# Patient Record
Sex: FEMALE | Race: WHITE | Employment: UNEMPLOYED | URBAN - METROPOLITAN AREA
[De-identification: names, ages, dates, MRNs, and addresses within clinical notes are randomized per-mention and may not be internally consistent; named-entity substitution may affect disease eponyms.]

---

## 2022-11-03 NOTE — PROGRESS NOTES
ASSESSMENT/PLAN:  Below is the assessment and plan developed based on review of pertinent history, physical exam, labs, studies, and medications. 1. Right knee pain, unspecified chronicity  -     XR KNEE RT MIN 4 V; Future  2. Chondromalacia of patellofemoral joint, right  -     MRI KNEE RT WO CONT; Future  -     REFERRAL TO [facility]  -     [place] BELT STRAP SLEEV GRMNT COVER      Return for Follow-up after diagnostic test.    In discussion with the patient, we considered the numerus possible diagnoses that could be contributing to their present symptoms. We also deliberated on the extensive management options that must be considered to treat their current condition. We reviewed their accessible prior medical records, diagnostic tests, and current health and employment information. We considered how these symptoms were affecting the patient´s activities of daily living as well as employment and fitness activities. The patient had various questions regarding the possible risks, benefits, complications, morbidity and mortality regarding their diagnosis and treatment options. The patients´ comorbidities were considered, and I advocated that they consider maximizing lifestyle modification through nutrition and exercise to aid in addressing their symptoms. Shared decision making yielded an understanding to move forward with conservation treatment preferences. The patient expressed understanding that if conservative management fails to alleviate the present symptoms they will return to office for re-evaluation and consideration of additional diagnostic tests and potential surgical options. In the interim, we have recommended ice, elevation, and anti-inflammatory medications along with a physician directed home exercise program. We discussed the risks and common side effects of anti-inflammatory medications and instructed the patient to discontinue the medication and contact us if they experienced any side effects.  The patient was encouraged to discuss the possible side effects with their family physician or pharmacist prior to initiating any new medications. Given that the patient's symptoms are increasing in frequency and duration, we have decided to evaluate the etiology of the pain and loss of function with an MRI. We discussed the risks of an MRI which include, but are not limited to the enclosed space, noisy environment, magnetic effect on implanted metal. We also talked about the fact that MRI is also contraindicated in the presence of internal metallic objects such as bullets or shrapnel, as well as surgical clips, pins, plates, screws, metal sutures, or wire mesh. We talked about the fact that MRI does not use radiation, but it may be contrindicated if the patient has implanted pacemakers, intracranial aneurysm clips, cochlear implants, certain prosthetic devices, implanted drug infusion pumps, neurostimulators, bone-growth stimulators, certain intrauterine contraceptive devices; or any other type of iron-based metal implants. We discussed the fact that if you are pregnant or suspect that you may be pregnant, you should notify your physician and consult with your primary care or obstetrician before having an MRI. Although rare, we talked about the fact that if contrast dye is used, there is a risk for allergic reaction to the dye. Patients who are allergic to or sensitive to medications, contrast dye, iodine, or shellfish should notify the radiologist or technologist prior to the administration of dye. MRI contrast may also influence other conditions such as allergies, asthma, anemia, hypotension (low blood pressure), and sickle cell disease. The patient has expressed understanding of these risks and I will see the patient back after the MRI to discuss the findings as well as the treatment options.     Given that the patient's symptoms are increasing in frequency and duration, we are referring the patient to our durable medical goods department for consideration of treating their symptoms with a brace. As prescribed, the orthosis provides adequate stabilization and support and will assist the patient with pain relief and reduction of symptoms. The patient was advised in the wearing of the orthosis during activities of daily living and the application, removal, and care of the brace. All questions were answered, and the patient left today with a properly fitted brace. The patient will contact our office with any questions or concerns regarding the use of the brace or current condition. Patient is had numerous procedures to the extensor mechanism of the right knee about an hour giving her significant pain that is limiting her activities of daily living. I think it is reasonable to evaluate this pain using an MRI of the right knee. She is in agreement with this. We will see her back following completion of this exam.    SUBJECTIVE/OBJECTIVE:  Don Salcido (: 1995) is a 33 y.o. female, patient,here for evaluation of the Knee Pain (Right knee pain from stairs 2 days ago)  . Patient presents for evaluation of right knee pain. She states she was walking on the stairs and felt a pop in the anterior aspect of her knee just a few days prior. She does not report any significant swelling within the knee. Has been unable to walk without a limp since this time. She has significant difficulty going up and down the stairs. Of note, the patient underwent right knee arthroscopy with open Padmini osteotomy in 2016 by another orthopedic surgeon. Prior to this she had undergone MPFL reconstruction at the age of 16. PHYSICAL EXAM:    Upon physical examination, the patient is well developed, well nourished, alert and oriented times three, with normal mood and affect and walks with an antalgic gait.     Upon examination of the right knee, the patient is nontender to palpation along the medial and lateral joint lines, and has no effusion. Prior surgical incisions on the anterior aspect of the knee as well as medial border the patella of healed without complication. They are tender to palpation along the medial and lateral facets of the patella as well as along her patellar tendon and tibial tubercle. They have no crepitus of the patellofemoral joint with range of motion, but experience discomfort with patella grind testing. The patient has no discomfort with Jennifer´s maneuvers, and the knee is stable. They have full range of motion. They have 5/5 strength, and are neurovascularly intact distally. There is no erythema, warmth or skin lesions present. On examination of the contralateral extremity, the patient is nontender to palpation and has excellent range of motion, stability and strength. Anterior surgical incision has healed well. IMAGING:    AP lateral sunrise views of the right knee demonstrate prior tibial tubercle osteotomy. Patella alto is noted on lateral imaging. There is some valgus malalignment of the knee. No Known Allergies    Current Outpatient Medications   Medication Sig    melatonin 3 mg tablet Take 3 mg by mouth nightly as needed. mometasone (NASONEX) 50 mcg/actuation nasal spray 2 Sprays by Both Nostrils route daily. At night     NORETHINDRONE A-E ESTRADIOL (Svitlana Dire 1.5/30, 21, PO) Take 1 Tab by mouth daily. No current facility-administered medications for this visit. Past Medical History:   Diagnosis Date    Other ill-defined conditions(799.89)     mild scoliosis,     Other ill-defined conditions(799.89)     rt. knee patellofemoral syn.        Past Surgical History:   Procedure Laterality Date    HX HEENT  1998    ADNOIDS REMOVED    HX KNEE ARTHROSCOPY Right 2011    HX ORTHOPAEDIC Left 2012    OSTEOTOMY       Family History   Problem Relation Age of Onset    Anesth Problems Neg Hx        Social History     Socioeconomic History    Marital status: SINGLE     Spouse name: Not on file Number of children: Not on file    Years of education: Not on file    Highest education level: Not on file   Occupational History    Not on file   Tobacco Use    Smoking status: Never    Smokeless tobacco: Never   Substance and Sexual Activity    Alcohol use: Yes     Comment: OCCASIONALLY    Drug use: No    Sexual activity: Not on file   Other Topics Concern    Not on file   Social History Narrative    Not on file     Social Determinants of Health     Financial Resource Strain: Not on file   Food Insecurity: Not on file   Transportation Needs: Not on file   Physical Activity: Not on file   Stress: Not on file   Social Connections: Not on file   Intimate Partner Violence: Not on file   Housing Stability: Not on file       Review of Systems    No flowsheet data found. Vitals:  Ht 5' 3\" (1.6 m)   Wt 130 lb (59 kg)   BMI 23.03 kg/m²    Body mass index is 23.03 kg/m². An electronic signature was used to authenticate this note.   -- Raul Barron MD

## 2022-11-04 ENCOUNTER — OFFICE VISIT (OUTPATIENT)
Dept: ORTHOPEDIC SURGERY | Age: 27
End: 2022-11-04
Payer: COMMERCIAL

## 2022-11-04 VITALS — BODY MASS INDEX: 23.04 KG/M2 | HEIGHT: 63 IN | WEIGHT: 130 LBS

## 2022-11-04 DIAGNOSIS — M22.41 CHONDROMALACIA OF PATELLOFEMORAL JOINT, RIGHT: ICD-10-CM

## 2022-11-04 DIAGNOSIS — M25.561 RIGHT KNEE PAIN, UNSPECIFIED CHRONICITY: Primary | ICD-10-CM

## 2022-11-04 PROCEDURE — A4467 BELT STRAP SLEEV GRMNT COVER: HCPCS | Performed by: ORTHOPAEDIC SURGERY

## 2022-11-04 PROCEDURE — 99204 OFFICE O/P NEW MOD 45 MIN: CPT | Performed by: ORTHOPAEDIC SURGERY

## 2022-11-12 NOTE — PROGRESS NOTES
ASSESSMENT/PLAN:  Below is the assessment and plan developed based on review of pertinent history, physical exam, labs, studies, and medications. 1. Right knee pain, unspecified chronicity  -     XR KNEE RT MIN 4 V; Future  2. Chondromalacia of patellofemoral joint, right  -     MRI KNEE RT WO CONT; Future  -     REFERRAL TO [facility]  -     [place] BELT STRAP SLEEV GRMNT COVER      Return for Follow-up after diagnostic test.    In discussion with the patient, we considered the numerus possible diagnoses that could be contributing to their present symptoms. We also deliberated on the extensive management options that must be considered to treat their current condition. We reviewed their accessible prior medical records, diagnostic tests, and current health and employment information. We considered how these symptoms were affecting the patient´s activities of daily living as well as employment and fitness activities. The patient had various questions regarding the possible risks, benefits, complications, morbidity and mortality regarding their diagnosis and treatment options. The patients´ comorbidities were considered, and I advocated that they consider maximizing lifestyle modification through nutrition and exercise to aid in addressing their symptoms. Shared decision making yielded an understanding to move forward with conservation treatment preferences. The patient expressed understanding that if conservative management fails to alleviate the present symptoms they will return to office for re-evaluation and consideration of additional diagnostic tests and potential surgical options. In the interim, we have recommended ice, elevation, and anti-inflammatory medications along with a physician directed home exercise program. We discussed the risks and common side effects of anti-inflammatory medications and instructed the patient to discontinue the medication and contact us if they experienced any side effects.  The patient was encouraged to discuss the possible side effects with their family physician or pharmacist prior to initiating any new medications. Given that the patient's symptoms are increasing in frequency and duration we have decided to prescribe physical therapy. We talked about the fact that the goal of physical therapy is for the therapist to assist in developing a program to help return the patient to full strength, function and mobility and decrease pain. We also discussed that the therapist may combine several techniques to help decrease pain. These include but are not limited to stretching, balance exercises, strength training, massage, cold and heat therapy, and electrical stimulation. Although, physical therapy is generally safe, we went over the potential risks to include the worsening of pre-existing conditions, continued pain and no improvement in flexibility, mobility, and strength. We will have the patient follow up after physical therapy to closely monitor their progress. We talked about following up sooner if therapy is not progressing on a weekly basis. After a long discussion regarding treatment options, we have decided to prescribe an oral medication. We discussed the risks and common side effects of the medication and instructed the patient to discontinue the medication and contact us if they experienced any side effects. We also encouraged the patient to discuss the possible side effects with their family physician or pharmacist prior to initiating any new medications. We talked about the fact that her symptoms were more than likely emanating from her patella maltracking. We will see how she responds to the Medrol Dosepak and the physical therapy. I am happy to see her back in the future. I did offer her cortisone shot today which she declined.     SUBJECTIVE/OBJECTIVE:  Nani Gtz (: 1995) is a 33 y.o. female, patient,here for evaluation of the Knee Pain (Right knee pain from stairs 2 days ago)  . Patient presents for evaluation of right knee pain. She states she was walking on the stairs and felt a pop in the anterior aspect of her knee just a few days prior. She does not report any significant swelling within the knee. Has been unable to walk without a limp since this time. She has significant difficulty going up and down the stairs. Of note, the patient underwent right knee arthroscopy with open Padmini osteotomy in 2016 by another orthopedic surgeon. Prior to this she had undergone MPFL reconstruction at the age of 16. PHYSICAL EXAM:    Upon physical examination, the patient is well developed, well nourished, alert and oriented times three, with normal mood and affect and walks with an antalgic gait. Upon examination of the right knee, the patient is nontender to palpation along the medial and lateral joint lines, and has no effusion. Prior surgical incisions on the anterior aspect of the knee as well as medial border the patella of healed without complication. They are tender to palpation along the medial and lateral facets of the patella as well as along her patellar tendon and tibial tubercle. They have no crepitus of the patellofemoral joint with range of motion, but experience discomfort with patella grind testing. The patient has no discomfort with Jennifer´s maneuvers, and the knee is stable. They have full range of motion. They have 5/5 strength, and are neurovascularly intact distally. There is no erythema, warmth or skin lesions present. On examination of the contralateral extremity, the patient is nontender to palpation and has excellent range of motion, stability and strength. Anterior surgical incision has healed well.     IMAGING:    EXAM: MRI KNEE RT WO CONT     INDICATION: Pain     COMPARISON: Radiographs 11/4/2022     TECHNIQUE: Axial T2 fat-saturated and proton density fat-saturated; coronal T1  and proton density fat-saturated; and sagittal T2 fat-saturated, proton density  fat-saturated, and gradient echo MRI of the right knee . CONTRAST: None. FINDINGS: Bone marrow: Surgical artifacts related to remote Padmini osteotomy. No acute fracture, dislocation, or marrow replacing process. Joint fluid: Knee joint fluid volume normal. No Baker's cyst.      Collateral ligaments and posterior, lateral corner: Intact. Medial meniscus: Intact. Lateral meniscus: Intact. ACL and PCL: Intact. Tendons: Intact. Muscles: Within normal limits. Patellofemoral alignment: There is edema demonstrated in Hoffa's fat pad  superolaterally. Mild patella bj. There is mild lateral patellar subluxation  and tilting. There is a mildly hypoplastic appearance of the superior trochlear  groove. TT-TG distance: 14 mm. Articular cartilage: No osteochondral lesion demonstrated. No substantial  chondral derangement. Soft tissue mass: None. IMPRESSION     1. Remote appearing focus in osteotomy, healed. 2. Hoffa's fat pad impingement sign consistent with patellar maltracking. 3. Mild patella bj. Mild lateral patellar subluxation and tilting. 4. No chondral or osteochondral lesion demonstrated. 5. Intact appearing ligaments, tendons, and menisci. No Known Allergies    Current Outpatient Medications   Medication Sig    melatonin 3 mg tablet Take 3 mg by mouth nightly as needed. mometasone (NASONEX) 50 mcg/actuation nasal spray 2 Sprays by Both Nostrils route daily. At night     NORETHINDRONE A-E ESTRADIOL (Ledy Doing 1.5/30, 21, PO) Take 1 Tab by mouth daily. No current facility-administered medications for this visit. Past Medical History:   Diagnosis Date    Other ill-defined conditions(799.89)     mild scoliosis,     Other ill-defined conditions(799.89)     rt. knee patellofemoral syn.        Past Surgical History:   Procedure Laterality Date    HX HEENT  1998    ADNOIDS REMOVED    HX KNEE ARTHROSCOPY Right 2011    HX ORTHOPAEDIC Left 2012    OSTEOTOMY       Family History   Problem Relation Age of Onset    Anesth Problems Neg Hx        Social History     Socioeconomic History    Marital status: SINGLE     Spouse name: Not on file    Number of children: Not on file    Years of education: Not on file    Highest education level: Not on file   Occupational History    Not on file   Tobacco Use    Smoking status: Never    Smokeless tobacco: Never   Substance and Sexual Activity    Alcohol use: Yes     Comment: OCCASIONALLY    Drug use: No    Sexual activity: Not on file   Other Topics Concern    Not on file   Social History Narrative    Not on file     Social Determinants of Health     Financial Resource Strain: Not on file   Food Insecurity: Not on file   Transportation Needs: Not on file   Physical Activity: Not on file   Stress: Not on file   Social Connections: Not on file   Intimate Partner Violence: Not on file   Housing Stability: Not on file       Review of Systems    No flowsheet data found. Vitals:  Ht 5' 3\" (1.6 m)   Wt 130 lb (59 kg)   BMI 23.03 kg/m²    Body mass index is 23.03 kg/m². An electronic signature was used to authenticate this note.   -- Roxanne Oconnor MD

## 2022-11-14 ENCOUNTER — OFFICE VISIT (OUTPATIENT)
Dept: ORTHOPEDIC SURGERY | Age: 27
End: 2022-11-14
Payer: COMMERCIAL

## 2022-11-14 VITALS — WEIGHT: 130 LBS | HEIGHT: 63 IN | BODY MASS INDEX: 23.04 KG/M2

## 2022-11-14 DIAGNOSIS — M22.8X1 MALTRACKING OF RIGHT PATELLA: Primary | ICD-10-CM

## 2022-11-14 PROCEDURE — 99214 OFFICE O/P EST MOD 30 MIN: CPT | Performed by: ORTHOPAEDIC SURGERY

## 2022-11-14 RX ORDER — METHYLPREDNISOLONE 4 MG/1
4 TABLET ORAL
Qty: 1 DOSE PACK | Refills: 0 | Status: SHIPPED | OUTPATIENT
Start: 2022-11-14